# Patient Record
Sex: FEMALE | Race: WHITE
[De-identification: names, ages, dates, MRNs, and addresses within clinical notes are randomized per-mention and may not be internally consistent; named-entity substitution may affect disease eponyms.]

---

## 2018-07-30 ENCOUNTER — HOSPITAL ENCOUNTER (EMERGENCY)
Dept: HOSPITAL 68 - ERH | Age: 73
End: 2018-07-30
Payer: COMMERCIAL

## 2018-07-30 VITALS — DIASTOLIC BLOOD PRESSURE: 75 MMHG | SYSTOLIC BLOOD PRESSURE: 166 MMHG

## 2018-07-30 VITALS — HEIGHT: 62 IN | BODY MASS INDEX: 38.64 KG/M2 | WEIGHT: 210 LBS
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
## 2018-07-30 DIAGNOSIS — S61.412A: Primary | ICD-10-CM

## 2018-07-30 NOTE — ED HAND/WRIST INJURY COMPLAINT
History of Present Illness
 
General
Chief Complaint: Laceration Procedure
Stated Complaint: LAC TO LEFT HAND
Source: patient
Exam Limitations: no limitations
 
Vital Signs & Intake/Output
Vital Signs & Intake/Output
 Vital Signs
 
 
Date Time Temp Pulse Resp B/P B/P Pulse O2 O2 Flow FiO2
 
     Mean Ox Delivery Rate 
 
07/30 2052 97.3 53 18 166/75  98 Room Air  
 
 
 ED Intake and Output
 
 
 07/31 0000 07/30 1200
 
Intake Total  
 
Output Total  
 
Balance  
 
   
 
Patient 210 lb 
 
Weight  
 
 
 
Allergies
Coded Allergies:
MDX - Tetracycline (TETRACYCLINE) ([facility] 06/22/13)
 
Triage Note:
73F CAUGHT HERSELF USING LEFT HAND AND CUT SIDE OF
 PALM ON THE METAL. NOT UTD ON TETANUS. INJURY
 OCCURRED AN HOUR AGO. BANDAID IN PLACE ON ARRIVAL,
 BLEEDING CONTROLLED
Triage Nurses Notes Reviewed? yes
Occurred: just prior to arrival
Duration: hour(s):
Timing: single episode today
Injury Environment: home
Severity: mild
Pain/Injury Location:
Left: Hand. 
Context: laceration
HPI:
73-year-old female presents emergency department complaining of laceration to 
left hand sustained at home prior to arrival.  Patient states that she was in 
the garage when she has recently cut her hand on metal material next to the 
door.  Bleeding was controlled at home prior to arrival.  Patient is unsure of 
when her last tetanus vaccine was.
(Britney Weir)
 
Past History
 
Travel History
Traveled to [place] past 21 day No
 
Medical History
Any Pertinent Medical History? see below for history
Cardiovascular: hypertension
Influenza Vaccine: 11/19/10
Tetanus Vaccine: 07/30/18
 
Surgical History
Surgical History: non-contributory
 
Psychosocial History
Who do you live with Daughter
What is your primary language English
Tobacco Use: Refused to answer
 
Family History
Hx Contributory? No
(Britney Weir)
 
Review of Systems
 
Review of Systems
Constitutional:
Reports: no symptoms. 
EENTM:
Reports: no symptoms. 
Respiratory:
Reports: no symptoms. 
Cardiovascular:
Reports: no symptoms. 
GI:
Reports: no symptoms. 
Genitourinary:
Reports: no symptoms. 
Musculoskeletal:
Reports: no symptoms. 
Skin:
Reports: see HPI. 
Neurological/Psychological:
Reports: no symptoms. 
Hematologic/Endocrine:
Reports: no symptoms. 
Immunologic/Allergic:
Reports: no symptoms. 
All Other Systems: Reviewed and Negative
(Britney Weir)
 
Physical Exam
 
Physical Exam
General Appearance: well developed/nourished, no apparent distress, alert, awake
Head: atraumatic, normal appearance
Eyes:
Bilateral: normal appearance. 
Ears, Nose, Throat: hearing grossly normal
Neck: normal inspection, supple, full range of motion
Cardiovascular/Respiratory: no respiratory distress
Back: normal inspection, normal range of motion
Wrist Left: normal range of motion, normal inspection
Wrist Right: normal range of motion, normal inspection
Hand Left: One centimeter irregular laceration to medial aspect of the palmar 
hand
Hand Right: normal inspection, normal range of motion
Neurologic/Tendon: normal sensation, normal motor functions, normal tendon 
functions
Skin: laceration as mentioned above
(Britney Weir)
 
Progress
Differential Diagnosis: contusion, laceration, tendon injury, foreign body
Plan of Care:
 Current Medications
 
 
  Sig/Sophy Start time  Last
 
Medication Dose  Stop Time Status Admin
 
Tetanus/Diphtheria  0.5 ML ONCE ONE 07/30 2230 UNVr 07/30
 
Toxoids Adsorbed   07/30 2231 2222
 
(Decavac)     
 
 
Laceration closed using sutures, patient tolerated procedure well.  Tetanus 
status updated today.  Patient educated on signs and symptoms of skin infection.
 No evidence of foreign body present in wound following irrigation.  Patient 
will return for suture removal.  She agrees with the plan of care.
(Britney Weir)
 
Departure
 
Departure
Disposition: HOME OR SELF CARE
Condition: Stable
Clinical Impression
Primary Impression: Hand laceration
Qualifiers:  Encounter type: initial encounter Foreign body presence: without 
foreign body Laterality: left Qualified Code: S61.412A - Laceration without 
foreign body of left hand, initial encounter
Referrals:
Patel Clark MD (PCP/Family)
 
Additional Instructions:
Return in 7-10 days for removal of stitches.  Monitor for signs of skin 
infection such as redness, swelling, increasing pain.  With any of these 
symptoms please return sooner.
 
Please note that there might be incidental findings in your evaluation that are 
unrelated to the current emergency department visit.  Please notify your primary
care doctor about this emergency department visit in order to obtain and review 
all of the testing performed so that these incidental findings can be monitored 
as needed.
 
If you had an x-ray performed, please understand that some fractures may not be 
seen on the initial set of x-rays.  If your symptoms persist you might need a 
repeat set of x-rays to check for such a fracture.
 
If you had a laceration evaluated, please understand that foreign bodies such as
glass or wood may not be visible to the naked eye or on plain x-rays.  If the 
wound becomes red, swollen, increasingly more painful or if there is any 
drainage from the wound, please have it reevaluated by a physician for the 
possibility of a retained foreign body.
 
If you're unable to follow up as outlined in the discharge instructions please 
return to the emergency department.
 
Thank you for choosing the [facility] Emergency Department for your care.
It was a pleasure to serve you today.
Departure Forms:
Customer Survey
General Discharge Information
(Fidelia CJEA,Britney Levi)
 
PA/NP Co-Sign Statement
Statement:
ED Attending supervision documentation-
 
 
[x] I have reviewed the ED Record and agree with the PA's/NP's documentation.
 
(Magy HERMOSILLO,Jose Daniel WRIGHT)
 
Procedures
 
Laceration/Wound Repair
Laceration/Wound Repair:
   Wound Location: left hand
   Wound's Depth, Shape: irregular
   Wound Length (cm): 1
   Wound Explored: irrigated extensively
   Irrigated w/ Saline (ccs): 300
   Betadine Prep? Yes
   Anesthesia: 1% lidocaine
   Volume Anesthetic (ccs): 3
   Wound Repaired With: sutures
   Suture Size/Type: 4:0, nylon
   Number of Sutures: 3
   Layer Closure? No
   Sterile Dressing Applied: Yes
   Date of Last Tetanus: 07/30/18
   Tetanus Status: up to date
Progress:
Patient tolerated procedure well.
(Fidelia CEJA,Britney Levi)

## 2018-08-08 ENCOUNTER — HOSPITAL ENCOUNTER (EMERGENCY)
Dept: HOSPITAL 68 - ERH | Age: 73
End: 2018-08-08
Payer: COMMERCIAL

## 2018-08-08 VITALS — SYSTOLIC BLOOD PRESSURE: 150 MMHG | DIASTOLIC BLOOD PRESSURE: 76 MMHG

## 2018-08-08 VITALS — WEIGHT: 210 LBS | HEIGHT: 62 IN | BODY MASS INDEX: 38.64 KG/M2

## 2018-08-08 DIAGNOSIS — Z48.02: Primary | ICD-10-CM

## 2018-08-08 NOTE — ED ANIMAL BITE/WOUND CHECK
History of Present Illness
 
General
Chief Complaint: Suture Removal/Wound Recheck
Stated Complaint: HAND SUTURE REMOVAL
Source: patient
Exam Limitations: no limitations
 
Vital Signs & Intake/Output
Vital Signs & Intake/Output
 ED Intake and Output
 
 
 08/09 0000 08/08 1200
 
Intake Total  
 
Output Total  
 
Balance  
 
   
 
Patient  210 lb
 
Weight  
 
Weight  Reported by Patient
 
Measurement  
 
Method  
 
 
 
Allergies
Coded Allergies:
MDX - Tetracycline (TETRACYCLINE) ([facility] 06/22/13)
 
Triage Note:
PT TO ED WITH C/O LEFT HAND SUTURE REMOVAL.
BOLA YANG IN TRIAGE FOR SUTURE REMOVAL AT THIS TIME.
Triage Nurses Notes Reviewed? yes
Onset: Abrupt
Duration: week(s):, constant, continues in ED
Timing: single episode today
Injury Environment: home
Is Injury an Animal Bite? No
No Modifying Factors: none
LMP (ages 10-50): unknown
Pregnant: No
Patient currently breastfeeds: No
HPI:
73-year-old female presents for suture removal.  She was seen over a week ago 
for a laceration to her left hand.  She's been keep area clean and dry denies 
any redness swelling discharge or pain no fever.
(Bola Kay)
 
Past History
 
Medical History
Any Pertinent Medical History? see below for history
Cardiovascular: hypertension
Tetanus Vaccine: 07/30/18
 
Surgical History
Surgical History: non-contributory
 
Psychosocial History
Who do you live with Daughter
What is your primary language English
 
Family History
Hx Contributory? No
(Bola Kay)
 
Review of Systems
 
Review of Systems
Constitutional:
Reports: no symptoms. 
EENTM:
Reports: no symptoms. 
Respiratory:
Reports: no symptoms. 
Cardiovascular:
Reports: no symptoms. 
GI:
Reports: no symptoms. 
Genitourinary:
Reports: no symptoms. 
Musculoskeletal:
Reports: no symptoms. 
Skin:
Reports: see HPI (laceration ). 
Neurological/Psychological:
Reports: no symptoms. 
Hematologic/Endocrine:
Reports: no symptoms. 
Immunologic/Allergic:
Reports: no symptoms. 
All Other Systems: Reviewed and Negative
(Bola Kay)
 
Physical Exam
 
Physical Exam
General Appearance: well developed/nourished, no apparent distress, alert, awake
Head: atraumatic, normal appearance
Eyes:
Bilateral: normal appearance, EOMI. 
Ears, Nose, Throat: hearing grossly normal
Neck: normal inspection, supple, full range of motion
Respiratory: no respiratory distress
Peripheral Pulses:
2+ radial (R), 2+ radial (L)
Back: normal inspection, normal range of motion
Extremities: normal range of motion
Neurologic/Psych: no motor/sensory deficits, awake, alert, oriented x 3, normal 
gait, normal mood/affect
Skin: intact, normal color, warm/dry, there is a 1.5 cm linear well-healed 
laceration on the palm the left hand.  There is no underlying erythema discharge
swelling.  Sutures are intact.  Range of motion and intact neurovascular supply 
to
(Bola Kay)
 
Progress
Differential Diagnosis: abscess, cellulitis, joint infection, tenosysnovitis
Plan of Care:
Patient is here for suture removal.  The laceration appears well-healed.  
Sutures were removed without difficulty patient tolerated well.  Discussed wound
care procedures discussed return precautions patient agrees the plan case 
discussed with Dr. Yadav agrees
(Bola Kay)
 
Departure
 
Departure
Disposition: HOME OR SELF CARE
Condition: Stable
Clinical Impression
Primary Impression: Visit for suture removal
Referrals:
Patel Clark MD (PCP/Family)
 
Additional Instructions:
Keep area clean and dry.  Look out for signs of infection like redness swelling 
discharge or pain.  Tylenol for pain medication follow-up with your doctor for 
recheck in a few days return with any concerns.
Departure Forms:
Customer Survey
General Discharge Information
(Bola Kay)
 
PA/NP Co-Sign Statement
Statement:
ED Attending supervision documentation-
 
[] I saw and evaluated the patient. I have also reviewed all the pertinent lab 
results and diagnostic results. I agree with the findings and the plan of care 
as documented in the PA's/NP's documentation. 
 
[X] I have reviewed the ED Record and agree with the PA's/NP's documentation.
 
[] Additions or exceptions (if any) to the PAs/NP's note and plan are 
summarized below:
[]
 
(Raffy Yadav DO